# Patient Record
Sex: FEMALE | Race: ASIAN | NOT HISPANIC OR LATINO | Employment: OTHER | URBAN - METROPOLITAN AREA
[De-identification: names, ages, dates, MRNs, and addresses within clinical notes are randomized per-mention and may not be internally consistent; named-entity substitution may affect disease eponyms.]

---

## 2020-10-08 ENCOUNTER — HOSPITAL ENCOUNTER (EMERGENCY)
Facility: CLINIC | Age: 70
Discharge: HOME | End: 2020-10-08
Attending: EMERGENCY MEDICINE | Admitting: EMERGENCY MEDICINE
Payer: COMMERCIAL

## 2020-10-08 VITALS
HEART RATE: 78 BPM | TEMPERATURE: 98 F | OXYGEN SATURATION: 98 % | SYSTOLIC BLOOD PRESSURE: 126 MMHG | RESPIRATION RATE: 20 BRPM | DIASTOLIC BLOOD PRESSURE: 68 MMHG

## 2020-10-08 DIAGNOSIS — S61.210A LACERATION OF RIGHT INDEX FINGER, FOREIGN BODY PRESENCE UNSPECIFIED, NAIL DAMAGE STATUS UNSPECIFIED, INITIAL ENCOUNTER: ICD-10-CM

## 2020-10-08 PROCEDURE — 99283 EMERGENCY DEPT VISIT LOW MDM: CPT | Mod: 25

## 2020-10-08 PROCEDURE — 90714 TD VACC NO PRESV 7 YRS+ IM: CPT | Performed by: EMERGENCY MEDICINE

## 2020-10-08 PROCEDURE — 90471 IMMUNIZATION ADMIN: CPT | Performed by: EMERGENCY MEDICINE

## 2020-10-08 PROCEDURE — 12001 RPR S/N/AX/GEN/TRNK 2.5CM/<: CPT

## 2020-10-08 PROCEDURE — 250N000011 HC RX IP 250 OP 636: Performed by: EMERGENCY MEDICINE

## 2020-10-08 RX ORDER — LIDOCAINE HYDROCHLORIDE 10 MG/ML
INJECTION, SOLUTION EPIDURAL; INFILTRATION; INTRACAUDAL; PERINEURAL
Status: DISCONTINUED
Start: 2020-10-08 | End: 2020-10-08 | Stop reason: HOSPADM

## 2020-10-08 RX ORDER — LIDOCAINE HYDROCHLORIDE 10 MG/ML
INJECTION, SOLUTION INFILTRATION; PERINEURAL
Status: DISCONTINUED
Start: 2020-10-08 | End: 2020-10-08 | Stop reason: HOSPADM

## 2020-10-08 RX ADMIN — CLOSTRIDIUM TETANI TOXOID ANTIGEN (FORMALDEHYDE INACTIVATED) AND CORYNEBACTERIUM DIPHTHERIAE TOXOID ANTIGEN (FORMALDEHYDE INACTIVATED) 0.5 ML: 5; 2 INJECTION, SUSPENSION INTRAMUSCULAR at 16:07

## 2020-10-08 ASSESSMENT — ENCOUNTER SYMPTOMS
NUMBNESS: 0
WOUND: 1

## 2020-10-08 NOTE — ED PROVIDER NOTES
History   Chief Complaint:  Laceration    The history is provided by the patient and a relative. The history is limited by a language barrier (Daughter acted as ).     Isabel Byrne is a 70 year old female with a history of type 2 DM and Alzheimer's who presents for evaluation of a finger laceration. Her daughter reports the patient was cutting an orange around 1200 today when she sliced her right index finger. She states she was not home at the time but the patient called her and wrapped the wound. Here she is able to bend and straighten her finger, and denies anticoagulation. Her last tetanus was in 2012.    Allergies:  Ibuprofen  Aspirin  Niacin    Medications:    Glucotrol  Atarax    Past Medical History:    DM type 2  Asthma  COPD  Alzheimer's  GERD    Past Surgical History:    Cataract removal    Family History:    No past pertinent family history.    Social History:  Marital Status: Single  Presents with daughter at bedside  Tobacco use: Current every day smoker  Alcohol use: No  Drug use: No    Review of Systems   Skin: Positive for wound.   Neurological: Negative for numbness.     Physical Exam     Patient Vitals for the past 24 hrs:   BP Temp Temp src Pulse Resp SpO2   10/08/20 1559 -- -- -- 78 -- --   10/08/20 1507 126/68 98  F (36.7  C) Oral -- 20 98 %       Physical Exam  General: Patient is alert and interactive when I enter the room  Head:  The scalp, face, and head appear normal  Eyes:  Conjunctivae are normal  ENT:    The nose is normal    Pinnae are normal    External acoustic canals are normal  Neck:  Trachea midline  CV:  Pulses are normal   Resp:  No respiratory distress   Musc:  Normal muscular tone    No major joint effusions    No asymmetric leg swelling    2 cm laceration to right index finger on volar aspect, FDS and FDP intact with good strength, extension intact, sensation intact on radial and ulnar aspect of digit   Skin:  No rash or lesions noted  Neuro:  Speech is normal and  fluent. Face is symmetric.     Moving all extremities well.   Psych: Awake. Alert.  Normal affect.  Appropriate interactions.    Emergency Department Course     Procedures    Laceration Repair        LACERATION:  A simple clean 2 cm laceration.      LOCATION:  Right index finger      FUNCTION:  Distally sensation, circulation, motor and tendon function are intact.      ANESTHESIA:  Local using Lidocaine 1% w/o EPI, 3cc      PREPARATION:  Irrigation and Scrubbing with Normal Saline and Shur Clens      DEBRIDEMENT:  No debridement      CLOSURE:  Wound was closed with One Layer.  Skin closed with 4 x 5.0 Ethylon using interrupted sutures.    Interventions:  1607: Td, 0.5 mL, Intramuscular    Emergency Department Course:  Past medical records, nursing notes, and vitals reviewed.    1508: I performed an exam of the patient as documented above.     1550:   I performed a laceration repair which the patient tolerated well. The patient si comfortable with discharge at this time.    Findings and plan explained to the Patient. Patient discharged home with instructions regarding supportive care, medications, and reasons to return. The importance of close follow-up was reviewed.     I personally answered all related questions prior to discharge.     Impression & Plan     Medical Decision Making:  Isabel Byrne is a 70 year old female who presents for evaluation of a laceration. Findings and exam were consistent with uncomplicated laceration of right index finger, which was repaired as noted above. There is no evidence at this time of associated fracture or foreign body, deep space infection, tendon injury, or neurovascular injury.. The patient is to follow-up for suture removal in 7-10 days. Indications for immediate return to ER/UR were reviewed and included but are not limited to, redness, fevers, drainage, increasing pain, high fevers, or other concerns.     Diagnosis:    ICD-10-CM    1. Laceration of right index finger, foreign  body presence unspecified, nail damage status unspecified, initial encounter  S61.210C        Disposition:  Discharged to home.    Scribe Disclosure:  I, Denise Black, am serving as a scribe at 3:10 PM on 10/8/2020 to document services personally performed by Dayanara Leon MD based on my observations and the provider's statements to me.      Dayanara Leon MD  10/08/20 1757

## 2020-10-08 NOTE — ED AVS SNAPSHOT
[facility]  [address]  [facility] [address]  Phone: [phone]  Fax: [phone]                                    Isabel Byrne   MRN: [id number]    Department: [facility]   Date of Visit: 10/8/2020           After Visit Summary Signature Page    I have received my discharge instructions, and my questions have been answered. I have discussed any challenges I see with this plan with the nurse or doctor.    ..........................................................................................................................................  Patient/Patient Representative Signature      ..........................................................................................................................................  Patient Representative Print Name and Relationship to Patient    ..................................................               ................................................  Date                                   Time    ..........................................................................................................................................  Reviewed by Signature/Title    ...................................................              ..............................................  Date                                               Time          22EPIC Rev 08/18

## 2020-10-19 ENCOUNTER — HOSPITAL ENCOUNTER (EMERGENCY)
Facility: CLINIC | Age: 70
Discharge: HOME | End: 2020-10-19
Admitting: EMERGENCY MEDICINE
Payer: COMMERCIAL

## 2020-10-19 VITALS
SYSTOLIC BLOOD PRESSURE: 116 MMHG | RESPIRATION RATE: 20 BRPM | OXYGEN SATURATION: 100 % | TEMPERATURE: 97.3 F | HEART RATE: 82 BPM | DIASTOLIC BLOOD PRESSURE: 60 MMHG

## 2020-10-19 PROCEDURE — 999N000104 HC STATISTIC NO CHARGE

## 2020-10-19 NOTE — ED TRIAGE NOTES
Patient brought in by daughter for suture removal.Patient states she does not need to be seen by doctor and just wants stitches removed-they were placed 1 week ago. ABC intact alert and no distress.

## 2020-10-19 NOTE — ED NOTES
Nurse only visit - 4 sutures removed without difficulty. Wound is clean, dry and healing without problems. Bacitracin and bandage applied

## 2022-11-14 ENCOUNTER — HOSPITAL ENCOUNTER (EMERGENCY)
Facility: CLINIC | Age: 72
Discharge: HOME | End: 2022-11-14
Attending: EMERGENCY MEDICINE | Admitting: EMERGENCY MEDICINE
Payer: COMMERCIAL

## 2022-11-14 ENCOUNTER — APPOINTMENT (OUTPATIENT)
Dept: CT IMAGING | Facility: CLINIC | Age: 72
End: 2022-11-14
Attending: EMERGENCY MEDICINE
Payer: COMMERCIAL

## 2022-11-14 ENCOUNTER — APPOINTMENT (OUTPATIENT)
Dept: GENERAL RADIOLOGY | Facility: CLINIC | Age: 72
End: 2022-11-14
Attending: EMERGENCY MEDICINE
Payer: COMMERCIAL

## 2022-11-14 VITALS
OXYGEN SATURATION: 95 % | SYSTOLIC BLOOD PRESSURE: 128 MMHG | TEMPERATURE: 101.9 F | DIASTOLIC BLOOD PRESSURE: 62 MMHG | RESPIRATION RATE: 20 BRPM | HEART RATE: 96 BPM

## 2022-11-14 DIAGNOSIS — J45.21 MILD INTERMITTENT ASTHMA WITH EXACERBATION: ICD-10-CM

## 2022-11-14 DIAGNOSIS — J10.1 INFLUENZA A: ICD-10-CM

## 2022-11-14 LAB
ALBUMIN SERPL BCG-MCNC: 4 G/DL (ref 3.5–5.2)
ALP SERPL-CCNC: 82 U/L (ref 35–104)
ALT SERPL W P-5'-P-CCNC: 15 U/L (ref 10–35)
ANION GAP SERPL CALCULATED.3IONS-SCNC: 11 MMOL/L (ref 7–15)
AST SERPL W P-5'-P-CCNC: 39 U/L (ref 10–35)
BASOPHILS # BLD AUTO: 0 10E3/UL (ref 0–0.2)
BASOPHILS NFR BLD AUTO: 0 %
BILIRUB SERPL-MCNC: 0.7 MG/DL
BUN SERPL-MCNC: 9.1 MG/DL (ref 8–23)
CALCIUM SERPL-MCNC: 9.4 MG/DL (ref 8.8–10.2)
CHLORIDE SERPL-SCNC: 96 MMOL/L (ref 98–107)
CREAT SERPL-MCNC: 0.68 MG/DL (ref 0.51–0.95)
DEPRECATED HCO3 PLAS-SCNC: 26 MMOL/L (ref 22–29)
EOSINOPHIL # BLD AUTO: 0 10E3/UL (ref 0–0.7)
EOSINOPHIL NFR BLD AUTO: 0 %
ERYTHROCYTE [DISTWIDTH] IN BLOOD BY AUTOMATED COUNT: 12 % (ref 10–15)
FLUAV RNA SPEC QL NAA+PROBE: POSITIVE
FLUBV RNA RESP QL NAA+PROBE: NEGATIVE
GFR SERPL CREATININE-BSD FRML MDRD: >90 ML/MIN/1.73M2
GLUCOSE SERPL-MCNC: 252 MG/DL (ref 70–99)
HCT VFR BLD AUTO: 35 % (ref 35–47)
HGB BLD-MCNC: 11.5 G/DL (ref 11.7–15.7)
IMM GRANULOCYTES # BLD: 0 10E3/UL
IMM GRANULOCYTES NFR BLD: 0 %
LIPASE SERPL-CCNC: 34 U/L (ref 13–60)
LYMPHOCYTES # BLD AUTO: 0.7 10E3/UL (ref 0.8–5.3)
LYMPHOCYTES NFR BLD AUTO: 10 %
MCH RBC QN AUTO: 34.1 PG (ref 26.5–33)
MCHC RBC AUTO-ENTMCNC: 32.9 G/DL (ref 31.5–36.5)
MCV RBC AUTO: 104 FL (ref 78–100)
MONOCYTES # BLD AUTO: 0.7 10E3/UL (ref 0–1.3)
MONOCYTES NFR BLD AUTO: 10 %
NEUTROPHILS # BLD AUTO: 5.3 10E3/UL (ref 1.6–8.3)
NEUTROPHILS NFR BLD AUTO: 80 %
NRBC # BLD AUTO: 0 10E3/UL
NRBC BLD AUTO-RTO: 0 /100
PLATELET # BLD AUTO: 170 10E3/UL (ref 150–450)
POTASSIUM SERPL-SCNC: 5.1 MMOL/L (ref 3.4–5.3)
PROT SERPL-MCNC: 7.3 G/DL (ref 6.4–8.3)
RBC # BLD AUTO: 3.37 10E6/UL (ref 3.8–5.2)
RSV RNA SPEC NAA+PROBE: NEGATIVE
SARS-COV-2 RNA RESP QL NAA+PROBE: NEGATIVE
SODIUM SERPL-SCNC: 133 MMOL/L (ref 136–145)
WBC # BLD AUTO: 6.7 10E3/UL (ref 4–11)

## 2022-11-14 PROCEDURE — 250N000009 HC RX 250: Performed by: EMERGENCY MEDICINE

## 2022-11-14 PROCEDURE — 87637 SARSCOV2&INF A&B&RSV AMP PRB: CPT | Performed by: EMERGENCY MEDICINE

## 2022-11-14 PROCEDURE — 36415 COLL VENOUS BLD VENIPUNCTURE: CPT | Performed by: EMERGENCY MEDICINE

## 2022-11-14 PROCEDURE — 71046 X-RAY EXAM CHEST 2 VIEWS: CPT

## 2022-11-14 PROCEDURE — 93005 ELECTROCARDIOGRAM TRACING: CPT

## 2022-11-14 PROCEDURE — 83690 ASSAY OF LIPASE: CPT | Performed by: EMERGENCY MEDICINE

## 2022-11-14 PROCEDURE — 85004 AUTOMATED DIFF WBC COUNT: CPT | Performed by: EMERGENCY MEDICINE

## 2022-11-14 PROCEDURE — 80053 COMPREHEN METABOLIC PANEL: CPT | Performed by: EMERGENCY MEDICINE

## 2022-11-14 PROCEDURE — 96361 HYDRATE IV INFUSION ADD-ON: CPT

## 2022-11-14 PROCEDURE — C9803 HOPD COVID-19 SPEC COLLECT: HCPCS

## 2022-11-14 PROCEDURE — 250N000011 HC RX IP 250 OP 636: Performed by: EMERGENCY MEDICINE

## 2022-11-14 PROCEDURE — 96374 THER/PROPH/DIAG INJ IV PUSH: CPT | Mod: 59

## 2022-11-14 PROCEDURE — 258N000003 HC RX IP 258 OP 636: Performed by: EMERGENCY MEDICINE

## 2022-11-14 PROCEDURE — 99285 EMERGENCY DEPT VISIT HI MDM: CPT | Mod: 25

## 2022-11-14 PROCEDURE — 250N000013 HC RX MED GY IP 250 OP 250 PS 637: Performed by: EMERGENCY MEDICINE

## 2022-11-14 PROCEDURE — 74177 CT ABD & PELVIS W/CONTRAST: CPT

## 2022-11-14 RX ORDER — ONDANSETRON 2 MG/ML
4 INJECTION INTRAMUSCULAR; INTRAVENOUS EVERY 30 MIN PRN
Status: DISCONTINUED | OUTPATIENT
Start: 2022-11-14 | End: 2022-11-15 | Stop reason: HOSPADM

## 2022-11-14 RX ORDER — ONDANSETRON 4 MG/1
4 TABLET, ORALLY DISINTEGRATING ORAL ONCE
Status: COMPLETED | OUTPATIENT
Start: 2022-11-14 | End: 2022-11-14

## 2022-11-14 RX ORDER — OSELTAMIVIR PHOSPHATE 75 MG/1
75 CAPSULE ORAL 2 TIMES DAILY
Qty: 10 CAPSULE | Refills: 0 | Status: SHIPPED | OUTPATIENT
Start: 2022-11-14 | End: 2022-11-19

## 2022-11-14 RX ORDER — IOPAMIDOL 755 MG/ML
500 INJECTION, SOLUTION INTRAVASCULAR ONCE
Status: COMPLETED | OUTPATIENT
Start: 2022-11-14 | End: 2022-11-14

## 2022-11-14 RX ORDER — ONDANSETRON 4 MG/1
4 TABLET, ORALLY DISINTEGRATING ORAL EVERY 8 HOURS PRN
Qty: 10 TABLET | Refills: 0 | Status: SHIPPED | OUTPATIENT
Start: 2022-11-14 | End: 2022-11-17

## 2022-11-14 RX ORDER — PREDNISONE 20 MG/1
TABLET ORAL
Qty: 10 TABLET | Refills: 0 | Status: SHIPPED | OUTPATIENT
Start: 2022-11-14

## 2022-11-14 RX ORDER — ACETAMINOPHEN 500 MG
500 TABLET ORAL ONCE
Status: COMPLETED | OUTPATIENT
Start: 2022-11-14 | End: 2022-11-14

## 2022-11-14 RX ORDER — BENZONATATE 200 MG/1
200 CAPSULE ORAL 3 TIMES DAILY PRN
Qty: 30 CAPSULE | Refills: 0 | Status: SHIPPED | OUTPATIENT
Start: 2022-11-14

## 2022-11-14 RX ADMIN — SODIUM CHLORIDE 1000 ML: 9 INJECTION, SOLUTION INTRAVENOUS at 21:04

## 2022-11-14 RX ADMIN — ONDANSETRON 4 MG: 2 INJECTION INTRAMUSCULAR; INTRAVENOUS at 21:05

## 2022-11-14 RX ADMIN — SODIUM CHLORIDE 50 ML: 9 INJECTION, SOLUTION INTRAVENOUS at 21:43

## 2022-11-14 RX ADMIN — IOPAMIDOL 41 ML: 755 INJECTION, SOLUTION INTRAVENOUS at 21:43

## 2022-11-14 RX ADMIN — ONDANSETRON 4 MG: 4 TABLET, ORALLY DISINTEGRATING ORAL at 18:16

## 2022-11-14 RX ADMIN — ACETAMINOPHEN 500 MG: 500 TABLET ORAL at 20:03

## 2022-11-14 ASSESSMENT — ACTIVITIES OF DAILY LIVING (ADL): ADLS_ACUITY_SCORE: 33

## 2022-11-15 LAB
ATRIAL RATE - MUSE: 90 BPM
DIASTOLIC BLOOD PRESSURE - MUSE: NORMAL MMHG
INTERPRETATION ECG - MUSE: NORMAL
P AXIS - MUSE: 82 DEGREES
PR INTERVAL - MUSE: 128 MS
QRS DURATION - MUSE: 72 MS
QT - MUSE: 354 MS
QTC - MUSE: 433 MS
R AXIS - MUSE: 83 DEGREES
SYSTOLIC BLOOD PRESSURE - MUSE: NORMAL MMHG
T AXIS - MUSE: 78 DEGREES
VENTRICULAR RATE- MUSE: 90 BPM

## 2022-11-15 ASSESSMENT — ENCOUNTER SYMPTOMS
NAUSEA: 1
BLOOD IN STOOL: 0
SHORTNESS OF BREATH: 1
DIZZINESS: 1
CHILLS: 1
ABDOMINAL PAIN: 1
COUGH: 1
FEVER: 1
FATIGUE: 1
VOMITING: 1

## 2022-11-15 NOTE — ED NOTES
Rapid Assessment Note    History:   Patient is a 72-year-old female with past medical history of asthma and type 2 diabetes who presents to the emergency department with fever, nausea, vomiting, cough and epigastric abdominal pain.  Patient's daughter is present in the emergency department is providing history for her mother.  She notes that numerous individuals at home have been diagnosed with influenza recently.  However her mother has been having worsening cough, vomiting and abdominal pain which others have not had at home.  They continue to use her nebulizer and inhaler without significant relief.  Cough is been productive and consistent.  Also notes some fairly significant epigastric pain that is exacerbated with vomiting.  No history of abdominal surgeries.    Exam:   General: Patient is alert, awake and interactive when I enter the room  Head: The scalp, face, and head appear normal  Eyes: Conjunctivae are normal  ENT: The nose is normal, Pinnae are normal, External acoustic canals are normal  Neck: Trachea midline  CV: Pulses are normal.   Resp: No respiratory distress mild wheezing through multiple lung fields with coarse, breath sounds bilaterally.  Abdomen: Epigastric tenderness without guarding or rebound.  Musc: Normal muscular tone, moving all extremities.  Skin: No rash or lesions noted  Neuro: Speech is normal and fluent. Face is symmetric.   Psych: Normal affect.  Appropriate interactions.      Plan of Care:   Patient has a positive for influenza A which may be the cause of her symptoms however she is quite tender on her abdominal exam.  We will obtain CT imaging and blood work to assess for possible complication from her influenza infection.  I evaluated the patient and developed an initial plan of care. I discussed this plan and explained that I, or one of my partners, would be returning to complete the evaluation.     Prem Berg MD   11/14/2022  [employer]  [employer]      Prem Berg MD  11/14/22 4220

## 2022-11-15 NOTE — ED PROVIDER NOTES
History     Chief Complaint:  Flu Symptoms and Chest Pain       HPI   Patient is a 72-year-old female with past medical history of asthma and type 2 diabetes who presents to the emergency department with fever, nausea, vomiting, cough and epigastric abdominal pain.  Patient's daughter is present in the emergency department is providing history for her mother.  She notes that numerous individuals at home have been diagnosed with influenza recently.  However her mother has been having worsening cough, vomiting and abdominal pain which others have not had at home.  They continue to use her nebulizer and inhaler without significant relief.  Cough is been productive and consistent.  Also notes some fairly significant epigastric pain that is exacerbated with vomiting.  No history of abdominal surgeries.    ROS:  Review of Systems   Constitutional: Positive for chills, fatigue and fever.   Respiratory: Positive for cough and shortness of breath.    Cardiovascular: Positive for chest pain.   Gastrointestinal: Positive for abdominal pain (epigastric), nausea and vomiting. Negative for blood in stool.   Neurological: Positive for dizziness.   All other systems reviewed and are negative.    Allergies:  Ibuprofen     Medications:    benzonatate (TESSALON) 200 MG capsule  ondansetron (ZOFRAN ODT) 4 MG ODT tab  oseltamivir (TAMIFLU) 75 MG capsule  predniSONE (DELTASONE) 20 MG tablet  alum & mag hydroxide-simethicone (MYLANTA ES/MAALOX  ES) 400-400-40 MG/5ML SUSP  famotidine (PEPCID) 20 MG tablet  omeprazole (PRILOSEC) 40 MG capsule      Past Medical History:    Past Medical History:   Diagnosis Date     Diabetes (H)      Uncomplicated asthma        Past Surgical History:    No surgical history      Family History:    No pertinent history     Social History:   reports that she has been smoking. She has been smoking an average of .25 packs per day. She does not have any smokeless tobacco history on file. She reports that she does  not drink alcohol and does not use drugs.  PCP: [facility], [place]     Physical Exam     Patient Vitals for the past 24 hrs:   BP Temp Temp src Pulse Resp SpO2   11/14/22 2004 -- (!) 101.9  F (38.8  C) Temporal -- -- --   11/14/22 1813 128/62 100.2  F (37.9  C) Temporal 96 20 95 %        Physical Exam  General: Patient is alert, awake and interactive when I enter the room  Head: The scalp, face, and head appear normal  Eyes: Conjunctivae are normal  ENT: The nose is normal, Pinnae are normal, External acoustic canals are normal.   Neck: Trachea midline  CV: Pulses are normal.   Resp: No respiratory distress mild wheezing through multiple lung fields with coarse, breath sounds bilaterally.  Abdomen: Epigastric tenderness without guarding or rebound.  Musc: Normal muscular tone, moving all extremities.  Skin: No rash or lesions noted  Neuro: Speech is normal and fluent. Face is symmetric.   Psych: Normal affect.  Appropriate interactions.    Emergency Department Course   ECG:  ECG results from 11/14/22   EKG 12 lead     Value    Systolic Blood Pressure     Diastolic Blood Pressure     Ventricular Rate 90    Atrial Rate 90    IL Interval 128    QRS Duration 72        QTc 433    P Axis 82    R AXIS 83    T Axis 78    Interpretation ECG      Sinus rhythm  Right atrial enlargement  Borderline ECG  When compared with ECG of 13-MAR-2016 19:13,  No significant change was found         Imaging:  CT Abdomen Pelvis w Contrast   Final Result   IMPRESSION:       1.  No acute abnormality or specific cause of epigastric pain and vomiting identified.      2.  Large noninflamed 5 cm duodenal diverticulum.      3.  Scattered areas of mucous plugging within lower lobe bronchioles and scattered tiny tree-in-bud nodules at the lung bases, likely infectious or inflammatory. No consolidations or pleural effusions.      Chest XR,  PA & LAT   Final Result   IMPRESSION:       No focal airspace disease. No pleural effusion or  pneumothorax.      Prominent nipple shadows are identified at the lung bases, similar to prior exam.      The cardiomediastinal silhouette is unremarkable. Aortic calcifications.         Report per radiology    Laboratory:  Labs Ordered and Resulted from Time of ED Arrival to Time of ED Departure   INFLUENZA A/B & SARS-COV2 PCR MULTIPLEX - Abnormal       Result Value    Influenza A PCR Positive (*)     Influenza B PCR Negative      RSV PCR Negative      SARS CoV2 PCR Negative     COMPREHENSIVE METABOLIC PANEL - Abnormal    Sodium 133 (*)     Potassium 5.1      Chloride 96 (*)     Carbon Dioxide (CO2) 26      Anion Gap 11      Urea Nitrogen 9.1      Creatinine 0.68      Calcium 9.4      Glucose 252 (*)     Alkaline Phosphatase 82      AST 39 (*)     ALT 15      Protein Total 7.3      Albumin 4.0      Bilirubin Total 0.7      GFR Estimate >90     CBC WITH PLATELETS AND DIFFERENTIAL - Abnormal    WBC Count 6.7      RBC Count 3.37 (*)     Hemoglobin 11.5 (*)     Hematocrit 35.0       (*)     MCH 34.1 (*)     MCHC 32.9      RDW 12.0      Platelet Count 170      % Neutrophils 80      % Lymphocytes 10      % Monocytes 10      % Eosinophils 0      % Basophils 0      % Immature Granulocytes 0      NRBCs per 100 WBC 0      Absolute Neutrophils 5.3      Absolute Lymphocytes 0.7 (*)     Absolute Monocytes 0.7      Absolute Eosinophils 0.0      Absolute Basophils 0.0      Absolute Immature Granulocytes 0.0      Absolute NRBCs 0.0     LIPASE - Normal    Lipase 34        Emergency Department Course:  Reviewed:  I reviewed nursing notes, vitals, past medical history and Care Everywhere    Interventions:  Medications   ondansetron (ZOFRAN ODT) ODT tab 4 mg (4 mg Oral Given 11/14/22 1816)   acetaminophen (TYLENOL) tablet 500 mg (500 mg Oral Given 11/14/22 2003)   0.9% sodium chloride BOLUS (0 mLs Intravenous Stopped 11/14/22 2233)   iopamidol (ISOVUE-370) solution 500 mL (41 mLs Intravenous Given 11/14/22 2143)   CT scan flush  (50 mLs Intravenous Given 11/14/22 2143)        Disposition:  The patient was discharged to home.     Impression & Plan      Medical Decision Making:  Isabel Byrne is a 72 year old female who presents for evaluation of fever, nausea, vomiting, cough and epigastric abdominal pain.  Patient's daughter is present in the emergency department is providing history for her mother.  She notes that numerous individuals at home have been diagnosed with influenza recently.  However her mother has been having worsening cough, vomiting and abdominal pain which others have not had at home.  They continue to use her nebulizer and inhaler without significant relief.  On initial valuation here she is febrile but otherwise hemodynamically stable.  She is oxygenating well on room air.  On physical exam she did have some mild epigastric discomfort therefore CT of her abdomen and pelvis was obtained.  Thankfully this was negative for any evidence of surgical pathology.  The remainder of the patient's work-up was reassuring.  No evidence of hepatitis or pancreatitis.  Patient was treated symptomatically in the emergency department.  Given that she is 72 years old and has history of asthma she is high risk and therefore would benefit from treatment with Tamiflu.  Discussed further symptomatic cares as well.  No evidence of significant concomitant infection at this juncture.  Given that the patient is otherwise hemodynamically stable without significant hypoxia, I do not believe that the patient requires admission here today. They are at risk for pneumonia but no signs of this are detected on today's visit. Return to the ED for, increasing productive cough, shortness of breath, or confusion. I discussed my findings and plan with the patient and her daughter and they are amenable at this time.  All questions were answered and patient will be discharged home in stable condition.     Diagnosis:    ICD-10-CM    1. Influenza A  J10.1       2. Mild  intermittent asthma with exacerbation  J45.21            Discharge Medications:  Discharge Medication List as of 11/14/2022 10:33 PM      START taking these medications    Details   benzonatate (TESSALON) 200 MG capsule Take 1 capsule (200 mg) by mouth 3 times daily as needed for cough, Disp-30 capsule, R-0, Local Print      ondansetron (ZOFRAN ODT) 4 MG ODT tab Take 1 tablet (4 mg) by mouth every 8 hours as needed for nausea, Disp-10 tablet, R-0, Local Print      oseltamivir (TAMIFLU) 75 MG capsule Take 1 capsule (75 mg) by mouth 2 times daily for 5 days, Disp-10 capsule, R-0, Local Print      predniSONE (DELTASONE) 20 MG tablet Take two tablets (= 40mg) each day for 5 (five) days, Disp-10 tablet, R-0, Local Print              11/15/2022   MD Otis Novak, rPem Rand MD  11/15/22 0133

## 2022-11-15 NOTE — ED TRIAGE NOTES
Fever, cough, vomiting and chest pain x1 week.     Triage Assessment     Row Name 11/14/22 1219       Triage Assessment (Adult)    Airway WDL WDL       Cognitive/Neuro/Behavioral WDL    Cognitive/Neuro/Behavioral WDL WDL